# Patient Record
Sex: FEMALE | Race: BLACK OR AFRICAN AMERICAN | NOT HISPANIC OR LATINO | Employment: PART TIME | URBAN - METROPOLITAN AREA
[De-identification: names, ages, dates, MRNs, and addresses within clinical notes are randomized per-mention and may not be internally consistent; named-entity substitution may affect disease eponyms.]

---

## 2020-06-19 ENCOUNTER — OFFICE VISIT - HEALTHEAST (OUTPATIENT)
Dept: FAMILY MEDICINE | Facility: CLINIC | Age: 30
End: 2020-06-19

## 2020-06-19 DIAGNOSIS — Z76.89 ESTABLISHING CARE WITH NEW DOCTOR, ENCOUNTER FOR: ICD-10-CM

## 2020-06-19 DIAGNOSIS — F33.2 SEVERE EPISODE OF RECURRENT MAJOR DEPRESSIVE DISORDER, WITHOUT PSYCHOTIC FEATURES (H): ICD-10-CM

## 2020-06-19 DIAGNOSIS — T74.21XA CONFIRMED VICTIM OF SEXUAL ABUSE IN ADULTHOOD, INITIAL ENCOUNTER: ICD-10-CM

## 2020-06-19 DIAGNOSIS — R45.851 SUICIDAL IDEATION: ICD-10-CM

## 2020-06-19 ASSESSMENT — ANXIETY QUESTIONNAIRES
GAD7 TOTAL SCORE: 18
7. FEELING AFRAID AS IF SOMETHING AWFUL MIGHT HAPPEN: NEARLY EVERY DAY
1. FEELING NERVOUS, ANXIOUS, OR ON EDGE: MORE THAN HALF THE DAYS
6. BECOMING EASILY ANNOYED OR IRRITABLE: NEARLY EVERY DAY
5. BEING SO RESTLESS THAT IT IS HARD TO SIT STILL: SEVERAL DAYS
2. NOT BEING ABLE TO STOP OR CONTROL WORRYING: NEARLY EVERY DAY
4. TROUBLE RELAXING: NEARLY EVERY DAY
3. WORRYING TOO MUCH ABOUT DIFFERENT THINGS: NEARLY EVERY DAY

## 2020-06-19 ASSESSMENT — PATIENT HEALTH QUESTIONNAIRE - PHQ9: SUM OF ALL RESPONSES TO PHQ QUESTIONS 1-9: 20

## 2020-07-01 ENCOUNTER — OFFICE VISIT - HEALTHEAST (OUTPATIENT)
Dept: FAMILY MEDICINE | Facility: CLINIC | Age: 30
End: 2020-07-01

## 2020-07-01 DIAGNOSIS — Z59.71 INSURANCE COVERAGE PROBLEMS: ICD-10-CM

## 2020-07-01 ASSESSMENT — PATIENT HEALTH QUESTIONNAIRE - PHQ9: SUM OF ALL RESPONSES TO PHQ QUESTIONS 1-9: 15

## 2020-07-02 ENCOUNTER — COMMUNICATION - HEALTHEAST (OUTPATIENT)
Dept: NURSING | Facility: CLINIC | Age: 30
End: 2020-07-02

## 2020-07-07 ENCOUNTER — COMMUNICATION - HEALTHEAST (OUTPATIENT)
Dept: NURSING | Facility: CLINIC | Age: 30
End: 2020-07-07

## 2020-07-07 ASSESSMENT — ACTIVITIES OF DAILY LIVING (ADL): DEPENDENT_IADLS:: INDEPENDENT

## 2020-07-08 ENCOUNTER — COMMUNICATION - HEALTHEAST (OUTPATIENT)
Dept: CARE COORDINATION | Facility: CLINIC | Age: 30
End: 2020-07-08

## 2020-07-14 ENCOUNTER — COMMUNICATION - HEALTHEAST (OUTPATIENT)
Dept: CARE COORDINATION | Facility: CLINIC | Age: 30
End: 2020-07-14

## 2020-07-15 ENCOUNTER — AMBULATORY - HEALTHEAST (OUTPATIENT)
Dept: BEHAVIORAL HEALTH | Facility: CLINIC | Age: 30
End: 2020-07-15

## 2020-07-22 ENCOUNTER — COMMUNICATION - HEALTHEAST (OUTPATIENT)
Dept: CARE COORDINATION | Facility: CLINIC | Age: 30
End: 2020-07-22

## 2020-07-28 ENCOUNTER — COMMUNICATION - HEALTHEAST (OUTPATIENT)
Dept: CARE COORDINATION | Facility: CLINIC | Age: 30
End: 2020-07-28

## 2020-07-28 ENCOUNTER — COMMUNICATION - HEALTHEAST (OUTPATIENT)
Dept: NURSING | Facility: CLINIC | Age: 30
End: 2020-07-28

## 2020-08-04 ENCOUNTER — COMMUNICATION - HEALTHEAST (OUTPATIENT)
Dept: CARE COORDINATION | Facility: CLINIC | Age: 30
End: 2020-08-04

## 2020-08-25 ENCOUNTER — COMMUNICATION - HEALTHEAST (OUTPATIENT)
Dept: CARE COORDINATION | Facility: CLINIC | Age: 30
End: 2020-08-25

## 2020-09-14 ENCOUNTER — COMMUNICATION - HEALTHEAST (OUTPATIENT)
Dept: CARE COORDINATION | Facility: CLINIC | Age: 30
End: 2020-09-14

## 2020-09-15 ENCOUNTER — COMMUNICATION - HEALTHEAST (OUTPATIENT)
Dept: CARE COORDINATION | Facility: CLINIC | Age: 30
End: 2020-09-15

## 2020-09-16 ENCOUNTER — COMMUNICATION - HEALTHEAST (OUTPATIENT)
Dept: NURSING | Facility: CLINIC | Age: 30
End: 2020-09-16

## 2020-09-18 ENCOUNTER — COMMUNICATION - HEALTHEAST (OUTPATIENT)
Dept: CARE COORDINATION | Facility: CLINIC | Age: 30
End: 2020-09-18

## 2020-09-24 ENCOUNTER — COMMUNICATION - HEALTHEAST (OUTPATIENT)
Dept: CARE COORDINATION | Facility: CLINIC | Age: 30
End: 2020-09-24

## 2020-10-16 ENCOUNTER — COMMUNICATION - HEALTHEAST (OUTPATIENT)
Dept: CARE COORDINATION | Facility: CLINIC | Age: 30
End: 2020-10-16

## 2020-10-21 ENCOUNTER — COMMUNICATION - HEALTHEAST (OUTPATIENT)
Dept: NURSING | Facility: CLINIC | Age: 30
End: 2020-10-21

## 2020-10-23 ENCOUNTER — COMMUNICATION - HEALTHEAST (OUTPATIENT)
Dept: CARE COORDINATION | Facility: CLINIC | Age: 30
End: 2020-10-23

## 2020-10-27 ENCOUNTER — COMMUNICATION - HEALTHEAST (OUTPATIENT)
Dept: CARE COORDINATION | Facility: CLINIC | Age: 30
End: 2020-10-27

## 2020-11-16 ENCOUNTER — COMMUNICATION - HEALTHEAST (OUTPATIENT)
Dept: NURSING | Facility: CLINIC | Age: 30
End: 2020-11-16

## 2020-11-17 ENCOUNTER — COMMUNICATION - HEALTHEAST (OUTPATIENT)
Dept: CARE COORDINATION | Facility: CLINIC | Age: 30
End: 2020-11-17

## 2020-11-18 ENCOUNTER — COMMUNICATION - HEALTHEAST (OUTPATIENT)
Dept: CARE COORDINATION | Facility: CLINIC | Age: 30
End: 2020-11-18

## 2020-12-03 ENCOUNTER — COMMUNICATION - HEALTHEAST (OUTPATIENT)
Dept: CARE COORDINATION | Facility: CLINIC | Age: 30
End: 2020-12-03

## 2020-12-16 ENCOUNTER — COMMUNICATION - HEALTHEAST (OUTPATIENT)
Dept: CARE COORDINATION | Facility: CLINIC | Age: 30
End: 2020-12-16

## 2020-12-17 ENCOUNTER — COMMUNICATION - HEALTHEAST (OUTPATIENT)
Dept: NURSING | Facility: CLINIC | Age: 30
End: 2020-12-17

## 2021-01-04 ENCOUNTER — COMMUNICATION - HEALTHEAST (OUTPATIENT)
Dept: CARE COORDINATION | Facility: CLINIC | Age: 31
End: 2021-01-04

## 2021-01-04 ENCOUNTER — COMMUNICATION - HEALTHEAST (OUTPATIENT)
Dept: NURSING | Facility: CLINIC | Age: 31
End: 2021-01-04

## 2021-01-12 ENCOUNTER — COMMUNICATION - HEALTHEAST (OUTPATIENT)
Dept: CARE COORDINATION | Facility: CLINIC | Age: 31
End: 2021-01-12

## 2021-01-15 ENCOUNTER — COMMUNICATION - HEALTHEAST (OUTPATIENT)
Dept: CARE COORDINATION | Facility: CLINIC | Age: 31
End: 2021-01-15

## 2021-01-19 ENCOUNTER — COMMUNICATION - HEALTHEAST (OUTPATIENT)
Dept: NURSING | Facility: CLINIC | Age: 31
End: 2021-01-19

## 2021-04-23 ENCOUNTER — OFFICE VISIT - HEALTHEAST (OUTPATIENT)
Dept: FAMILY MEDICINE | Facility: CLINIC | Age: 31
End: 2021-04-23

## 2021-04-23 DIAGNOSIS — Z20.822 EXPOSURE TO COVID-19 VIRUS: ICD-10-CM

## 2021-04-24 ENCOUNTER — COMMUNICATION - HEALTHEAST (OUTPATIENT)
Dept: FAMILY MEDICINE | Facility: CLINIC | Age: 31
End: 2021-04-24

## 2021-05-03 ENCOUNTER — OFFICE VISIT - HEALTHEAST (OUTPATIENT)
Dept: FAMILY MEDICINE | Facility: CLINIC | Age: 31
End: 2021-05-03

## 2021-05-03 DIAGNOSIS — F51.01 PRIMARY INSOMNIA: ICD-10-CM

## 2021-05-03 DIAGNOSIS — F33.2 SEVERE EPISODE OF RECURRENT MAJOR DEPRESSIVE DISORDER, WITHOUT PSYCHOTIC FEATURES (H): ICD-10-CM

## 2021-05-03 RX ORDER — TRAZODONE HYDROCHLORIDE 50 MG/1
50 TABLET, FILM COATED ORAL AT BEDTIME
Qty: 30 TABLET | Refills: 0 | Status: SHIPPED | OUTPATIENT
Start: 2021-05-03

## 2021-05-03 RX ORDER — FLUOXETINE 10 MG/1
10 CAPSULE ORAL DAILY
Qty: 90 CAPSULE | Refills: 3 | Status: SHIPPED | OUTPATIENT
Start: 2021-05-03

## 2021-05-03 ASSESSMENT — PATIENT HEALTH QUESTIONNAIRE - PHQ9: SUM OF ALL RESPONSES TO PHQ QUESTIONS 1-9: 10

## 2021-05-03 ASSESSMENT — ANXIETY QUESTIONNAIRES
GAD7 TOTAL SCORE: 6
6. BECOMING EASILY ANNOYED OR IRRITABLE: NEARLY EVERY DAY
3. WORRYING TOO MUCH ABOUT DIFFERENT THINGS: NOT AT ALL
5. BEING SO RESTLESS THAT IT IS HARD TO SIT STILL: NOT AT ALL
4. TROUBLE RELAXING: NEARLY EVERY DAY
2. NOT BEING ABLE TO STOP OR CONTROL WORRYING: NOT AT ALL
1. FEELING NERVOUS, ANXIOUS, OR ON EDGE: NOT AT ALL
7. FEELING AFRAID AS IF SOMETHING AWFUL MIGHT HAPPEN: NOT AT ALL

## 2021-05-27 ASSESSMENT — PATIENT HEALTH QUESTIONNAIRE - PHQ9
SUM OF ALL RESPONSES TO PHQ QUESTIONS 1-9: 15
SUM OF ALL RESPONSES TO PHQ QUESTIONS 1-9: 20
SUM OF ALL RESPONSES TO PHQ QUESTIONS 1-9: 10

## 2021-05-28 ASSESSMENT — ANXIETY QUESTIONNAIRES
GAD7 TOTAL SCORE: 6
GAD7 TOTAL SCORE: 18

## 2021-06-09 NOTE — PROGRESS NOTES
Programs Applying For: Health Insurance/SNAP/CASH  County: [place]   Case #:  [facility] Worker:   Raj #:   PMI #:   Date Applied:     Outreach:   7/8/20: Outreach attempted x 1. Left message on voicemail with call back information and requested return call.  Plan: FRW will call again within one week.     Health Insurance Information:       Referral:      [facility] Benefits:  Is patient requesting help applying for SNAP/CASH? YES     Screening Questions:  1. Have you recently applied for any county benefits? If so, what and when? NO  2. How many people in your household?  1  3. Do you buy/eat food together? NO  4. What is the monthly gross income for the household (wages, social security, workers comp, and pension)?  Pts income $29/week     Insurance:  Is patient requesting help applying for insurance? YES     Screening Questions:   1. Have you recently applied for insurance or do for a renewal? If so, when?  NO  2. How many people in your household?  1  3. Do you file taxes, who do you claim? 1  4. What is the monthly gross income for the household (wages, social security, workers comp, and pension)?   Pts income $29/week

## 2021-06-09 NOTE — PROGRESS NOTES
Programs Applying For: Health Insurance/SNAP/CASH  County: [place]   Case #:  [facility] Worker:   Raj #:   PMI #:   Date Applied:     Outreach:   7/14/20: FRW called patient on referral and made appointment for 7/22 @ 10:00.  7/8/20: Outreach attempted x 1. Left message on voicemail with call back information and requested return call.  Plan: FRW will call again within one week.     Health Insurance Information:       Referral:      [facility] Benefits:  Is patient requesting help applying for SNAP/CASH? YES     Screening Questions:  1. Have you recently applied for any county benefits? If so, what and when? NO  2. How many people in your household?  1  3. Do you buy/eat food together? NO  4. What is the monthly gross income for the household (wages, social security, workers comp, and pension)?  Pts income $29/week     Insurance:  Is patient requesting help applying for insurance? YES     Screening Questions:   1. Have you recently applied for insurance or do for a renewal? If so, when?  NO  2. How many people in your household?  1  3. Do you file taxes, who do you claim? 1  4. What is the monthly gross income for the household (wages, social security, workers comp, and pension)?   Pts income $29/week
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
## 2021-06-09 NOTE — PROGRESS NOTES
Pt did not answer phone for 11am appt scheduled for today; left VM inviting her to reschedule if interested.  Gene Davis, JUSTIN, [facility]

## 2021-06-09 NOTE — PROGRESS NOTES
Programs Applying For: Health Insurance/SNAP/CASH  County: [place]   Case #:  [facility] Worker:   Raj #:   PMI #:   Date Applied:     Outreach:   7/24/20: FRW called patient at scheduled time. FRW left VM and will make another call next week.   7/22/20:  FRW called patient to complete MNSURE application online. MNsure system was down. FRW will call patient back on Friday at 10. Rebel, [id number]!  7/14/20: FRW called patient on referral and made appointment for 7/22 @ 10:00.  7/8/20: Outreach attempted x 1. Left message on voicemail with call back information and requested return call.  Plan: FRW will call again within one week.     Health Insurance Information:       Referral:      [facility] Benefits:  Is patient requesting help applying for SNAP/CASH? YES     Screening Questions:  1. Have you recently applied for any county benefits? If so, what and when? NO  2. How many people in your household?  1  3. Do you buy/eat food together? NO  4. What is the monthly gross income for the household (wages, social security, workers comp, and pension)?  Pts income $29/week     Insurance:  Is patient requesting help applying for insurance? YES     Screening Questions:   1. Have you recently applied for insurance or do for a renewal? If so, when?  NO  2. How many people in your household?  1  3. Do you file taxes, who do you claim? 1  4. What is the monthly gross income for the household (wages, social security, workers comp, and pension)?   Pts income $29/week

## 2021-06-09 NOTE — PROGRESS NOTES
Clinic Care Coordination Contact  Community Health Worker Initial Outreach    CHW Initial Information Gathering:  Referral Source: PCP  Current living arrangement:: Other(Currently staying with a friend)  Type of residence:: Homeless  Community Resources: None  Supplies used at home:: None  Informal Support system:: Friends  No PCP office visit in Past Year: No       Patient accepts CC: Yes, scheduled with Debbie on 7/7/2020 @ 2pm by phone.       Notes  Homeless- Staying with a friend  Was working and then got laid off due to COVID  She just got her approval letter for Unemployment for $49 a week  Will benefit to connect with FRW for Insurance, Cash/SNAP  Unable to afford medication

## 2021-06-09 NOTE — PROGRESS NOTES
"Maude Sanchez is a 29 y.o. female who is being evaluated via a billable telephone visit.      The patient has been notified of following:     \"This telephone visit will be conducted via a call between you and your physician/provider. We have found that certain health care needs can be provided without the need for a physical exam.  This service lets us provide the care you need with a short phone conversation.  If a prescription is necessary we can send it directly to your pharmacy.  If lab work is needed we can place an order for that and you can then stop by our lab to have the test done at a later time.    Telephone visits are billed at different rates depending on your insurance coverage. During this emergency period, for some insurers they may be billed the same as an in-person visit.  Please reach out to your insurance provider with any questions.    If during the course of the call the physician/provider feels a telephone visit is not appropriate, you will not be charged for this service.\"    Patient has given verbal consent to a Telephone visit? Yes    What phone number would you like to be contacted at? [phone]     Patient would like to receive their AVS by AVS Preference: Shanice.    Additional provider notes: follow up    Patient has her first therapy appointment in 2 weeks.  She has not been able to obtain her medications because she has no insurance.  She is making progress taking care of things in her own life and her PHQ 9 dropped from 20-15.  She feels stable though she does still sometimes have thoughts of hurting herself.    Assessment/Plan:  1. Insurance coverage problems  - Ambulatory referral to Care Management (Primary Care)    2. Depression  -No obvious active plan for suicidality today.  Patient has resources in place if she feels worse.  Patient is not started medication or started therapy.  She does need help obtaining her meds as she has no insurance currently.        Phone call " duration:  3 minutes    Kirsten Dickson MD

## 2021-06-09 NOTE — PROGRESS NOTES
Clinic Care Coordination Contact  [facility]/Voicemail       Clinical Data: Care Coordinator Outreach  Outreach attempted x 1.  Left message on patient's voicemail with call back information and requested return call.  Plan:  Care Coordinator will try to reach patient again in 1-2 business days.

## 2021-06-10 NOTE — PROGRESS NOTES
Programs Applying For: Health Insurance/SNAP/CASH  County: [place]   Case #:  [facility] Worker:   Raj #:   PMI #:   Date Applied:     Outreach:   8/25/20: Outreach attempted x 2. Left message on voicemail indicating last outreach attempt.   Plan: FRW will send an unreachable letter and remove from panel. Patient can be referred back to FRW.     8/4/20: FRW called patient to see if she wanted to schedule a new appointment with FRW. FRW left VM and will move outreach to 1-2 weeks.   7/31/20: FRW called patient at scheduled time. FRW left VM and will make another call next week.   7/28/20: FRW called patient, new appointment set for 7/31 @ 10:00.   7/24/20: FRW called patient at scheduled time. FRW left VM and will make another call next week.   7/22/20:  FRW called patient to complete MNSURE application online. MNsure system was down. FRW will call patient back on Friday at 10. Artemroxana [id number]!  7/14/20: FRW called patient on referral and made appointment for 7/22 @ 10:00.  7/8/20: Outreach attempted x 1. Left message on voicemail with call back information and requested return call.  Plan: FRW will call again within one week.     Health Insurance Information:       Referral:      [facility] Benefits:  Is patient requesting help applying for SNAP/CASH? YES     Screening Questions:  1. Have you recently applied for any county benefits? If so, what and when? NO  2. How many people in your household?  1  3. Do you buy/eat food together? NO  4. What is the monthly gross income for the household (wages, social security, workers comp, and pension)?  Pts income $29/week     Insurance:  Is patient requesting help applying for insurance? YES     Screening Questions:   1. Have you recently applied for insurance or do for a renewal? If so, when?  NO  2. How many people in your household?  1  3. Do you file taxes, who do you claim? 1  4. What is the monthly gross income for the household (wages, social security, workers comp, and  pension)?   Pts income $29/week

## 2021-06-10 NOTE — PROGRESS NOTES
Programs Applying For: Health Insurance/SNAP/CASH  County: [place]   Case #:  [facility] Worker:   Raj #:   PMI #:   Date Applied:     Outreach:   7/31/20: FRW called patient at scheduled time. FRW left VM and will make another call next week.   7/28/20: FRW called patient, new appointment set for 7/31 @ 10:00.   7/24/20: FRW called patient at scheduled time. FRW left VM and will make another call next week.   7/22/20:  FRW called patient to complete MNSURE application online. MNsure system was down. FRW will call patient back on Friday at 10. Rebel, [id number]!  7/14/20: FRW called patient on referral and made appointment for 7/22 @ 10:00.  7/8/20: Outreach attempted x 1. Left message on voicemail with call back information and requested return call.  Plan: FRW will call again within one week.     Health Insurance Information:       Referral:      [facility] Benefits:  Is patient requesting help applying for SNAP/CASH? YES     Screening Questions:  1. Have you recently applied for any county benefits? If so, what and when? NO  2. How many people in your household?  1  3. Do you buy/eat food together? NO  4. What is the monthly gross income for the household (wages, social security, workers comp, and pension)?  Pts income $29/week     Insurance:  Is patient requesting help applying for insurance? YES     Screening Questions:   1. Have you recently applied for insurance or do for a renewal? If so, when?  NO  2. How many people in your household?  1  3. Do you file taxes, who do you claim? 1  4. What is the monthly gross income for the household (wages, social security, workers comp, and pension)?   Pts income $29/week

## 2021-06-10 NOTE — PROGRESS NOTES
Programs Applying For: Health Insurance/SNAP/CASH  County: [place]   Case #:  [facility] Worker:   Raj #:   PMI #:   Date Applied:     Outreach:   8/4/20: FRW called patient to see if she wanted to schedule a new appointment with FRW. FRW left VM and will move outreach to 1-2 weeks.   7/31/20: FRW called patient at scheduled time. FRW left VM and will make another call next week.   7/28/20: FRW called patient, new appointment set for 7/31 @ 10:00.   7/24/20: FRW called patient at scheduled time. FRW left VM and will make another call next week.   7/22/20:  FRW called patient to complete MNSURE application online. MNsure system was down. FRW will call patient back on Friday at 10. Rebel, [id number]!  7/14/20: FRW called patient on referral and made appointment for 7/22 @ 10:00.  7/8/20: Outreach attempted x 1. Left message on voicemail with call back information and requested return call.  Plan: FRW will call again within one week.     Health Insurance Information:       Referral:      [facility] Benefits:  Is patient requesting help applying for SNAP/CASH? YES     Screening Questions:  1. Have you recently applied for any county benefits? If so, what and when? NO  2. How many people in your household?  1  3. Do you buy/eat food together? NO  4. What is the monthly gross income for the household (wages, social security, workers comp, and pension)?  Pts income $29/week     Insurance:  Is patient requesting help applying for insurance? YES     Screening Questions:   1. Have you recently applied for insurance or do for a renewal? If so, when?  NO  2. How many people in your household?  1  3. Do you file taxes, who do you claim? 1  4. What is the monthly gross income for the household (wages, social security, workers comp, and pension)?   Pts income $29/week

## 2021-06-10 NOTE — PROGRESS NOTES
Clinic Care Coordination Contact  Care Team Conversations     KHALIDA ALONSO made two unsuccessful attempts to reach pt today for follow up on goals.  However, FRW was able to reach pt and scheduled visit for 7/31/20.  Pt also scheduled to see PCP on 7/30/20.      KHALIDA ALONSO consulted with FRW.  FRW will update KHALIDA ALONSO after next visit.    Plan:  KHALIDA ALONSO to attempt outreach in 1-2 weeks to encourage pt to reschedule initial therapy appt which she No showed.

## 2021-06-11 NOTE — PROGRESS NOTES
Clinic Care Coordination Contact  Programs Applying For: Health Insurance/SNAP/CASH  County: [place]   Case #:  [facility] Worker:   Raj #:   PMI #:   Date Applied:     Outreach:   9/24/20: FRW called [employer] and spoke with Justine. Justine stated there is an issue on patients account due to her identity and patient needs to call Mnsure to fix. FRW called patient. Patient is going to call Mnsure and call FRW once she can move forward with application.  9/23/20: FRW and patient tried to apply for health insurance through Mns[employer] 4x and website was down. FRW will call patient again on 9/24 to complete application.  9/15/20: FRW called patient on referral. Appointment set for 9/23 to apply for Health Insurance through Mnsure/SNAP/CASH. FRW routed note to CC team for standard or work.   8/25/20: Outreach attempted x 2. Left message on voicemail indicating last outreach attempt.   Plan: FRW will send an unreachable letter and remove from panel. Patient can be referred back to FRW.     8/4/20: FRW called patient to see if she wanted to schedule a new appointment with FRW. FRW left VM and will move outreach to 1-2 weeks.   7/31/20: FRW called patient at scheduled time. FRW left VM and will make another call next week.   7/28/20: FRW called patient, new appointment set for 7/31 @ 10:00.   7/24/20: FRW called patient at scheduled time. FRW left VM and will make another call next week.   7/22/20:  FRW called patient to complete MNSURE application online. [employer] system was down. FRW will call patient back on Friday at 10. Rebel, [id number]!  7/14/20: FRW called patient on referral and made appointment for 7/22 @ 10:00.  7/8/20: Outreach attempted x 1. Left message on voicemail with call back information and requested return call.  Plan: FRW will call again within one week.     Health Insurance Information:       Referral:      [facility] Benefits:  Is patient requesting help applying for SNAP/CASH? YES     Screening Questions:  1.  Have you recently applied for any county benefits? If so, what and when? NO  2. How many people in your household?  1  3. Do you buy/eat food together? NO  4. What is the monthly gross income for the household (wages, social security, workers comp, and pension)?  Pts income $29/week     Insurance:  Is patient requesting help applying for insurance? YES     Screening Questions:   1. Have you recently applied for insurance or do for a renewal? If so, when?  NO  2. How many people in your household?  1  3. Do you file taxes, who do you claim? 1  4. What is the monthly gross income for the household (wages, social security, workers comp, and pension)?   Pts income $29/week  Goals addressed this encounter:   Goals Addressed    None       FRW is waiting for goals to be completed

## 2021-06-11 NOTE — PROGRESS NOTES
Clinic Care Coordination Contact    Situation: Patient chart reviewed by care coordinator.    Background: Patient was unreachable, but did call on 9-15 wanting to resume working with FRW.  [facility] CC had deleted goals on 9-15 per protocol.  Writer reactivated the goal for assistance with applying for county benefits.      Assessment: FRW has scheduled appointment.  No outreach needed at this time.      Plan/Recommendations: Delegating to CHW to contact in less than 30 days to assess goal progression.  [facility] CC available if needs arise. [facility] CC to do oversight in 43 days.     Jaqueline Duncan [facility]  Clinic Care Coordinator  [phone]

## 2021-06-11 NOTE — PROGRESS NOTES
CHW reviewed [facility] chart note from 9/14/2020  CHW delegation, Next CHW outreach due: 1 month 10/14/2020- Patient has no goals at this time- CHW will discuss establishing new goals at next outreach.  FRW is involved last outreach was with Nelly on 9/15/2020

## 2021-06-11 NOTE — PROGRESS NOTES
Clinic Care Coordination Contact  Programs Applying For: Health Insurance/SNAP/CASH  County: [place]   Case #:  [facility] Worker:   Raj #:   PMI #:   Date Applied:     Outreach:   9/23/20: FRW and patient tried to apply for health insurance through Mns[employer] 4x and website was down. FRW will call patient again on 9/24 to complete application.  9/15/20: FRW called patient on referral. Appointment set for 9/23 to apply for Health Insurance through Mnsure/SNAP/CASH. FRW routed note to CC team for standard or work.   8/25/20: Outreach attempted x 2. Left message on voicemail indicating last outreach attempt.   Plan: FRW will send an unreachable letter and remove from panel. Patient can be referred back to FRW.     8/4/20: FRW called patient to see if she wanted to schedule a new appointment with FRW. FRW left VM and will move outreach to 1-2 weeks.   7/31/20: FRW called patient at scheduled time. FRW left VM and will make another call next week.   7/28/20: FRW called patient, new appointment set for 7/31 @ 10:00.   7/24/20: FRW called patient at scheduled time. FRW left VM and will make another call next week.   7/22/20:  FRW called patient to complete MNSURE application online. [employer] system was down. FRW will call patient back on Friday at 10. Rebel, [id number]!  7/14/20: FRW called patient on referral and made appointment for 7/22 @ 10:00.  7/8/20: Outreach attempted x 1. Left message on voicemail with call back information and requested return call.  Plan: FRW will call again within one week.     Health Insurance Information:       Referral:      [place] Benefits:  Is patient requesting help applying for SNAP/CASH? YES     Screening Questions:  1. Have you recently applied for any county benefits? If so, what and when? NO  2. How many people in your household?  1  3. Do you buy/eat food together? NO  4. What is the monthly gross income for the household (wages, social security, workers comp, and pension)?  Pts income  $29/week     Insurance:  Is patient requesting help applying for insurance? YES     Screening Questions:   1. Have you recently applied for insurance or do for a renewal? If so, when?  NO  2. How many people in your household?  1  3. Do you file taxes, who do you claim? 1  4. What is the monthly gross income for the household (wages, social security, workers comp, and pension)?   Pts income $29/week  Goals addressed this encounter:   Goals Addressed    None       FRW is waiting for goals to be completed

## 2021-06-11 NOTE — PROGRESS NOTES
Clinic Care Coordination Contact  [facility]/Voicemail       Clinical Data: Care Coordinator Outreach  Outreach attempted x 3.  Left message on patient's voicemail with call back information and requested return call. Pt has been unreachable by FRW multiple attempts made. Per chart review pt has not rescheduled therapy appt    Plan: Care Coordinator will send unable to contact letter with care coordinator contact information via Simple Ad[employer]. Care Coordinator will do no further outreaches at this time.      ADDENDUM:    KHALIDA ALONSO called and spoke to pt while doing outreach with pts significant other.  Pt states she would like to connect with FRW.  KHALIDA ALONSO to put in new referral for FRW.    CHW to follow up in one month

## 2021-06-12 NOTE — PROGRESS NOTES
Clinic Care Coordination Contact  Programs Applying For: Health Insurance/SNAP/CASH  County: [place]   Case #:  [facility] Worker:   Raj #:   PMI #:   Date Applied:     Outreach:   10/27/20: FRW called pt to see if she spoke with Mnsure. Patient is going to call Mnsure and call FRW back.   10/16/20: FRW called pt to see if she was able to call Mnsure to discuss her account. FRW left VM and will make another outreach in 2 weeks.   9/24/20: FRW called [facility] and spoke with Justine. Justine stated there is an issue on patients account due to her identity and patient needs to call Mnsure to fix. FRW called patient. Patient is going to call Mnsure and call FRW once she can move forward with application.  9/23/20: FRW and patient tried to apply for health insurance through Mnsure 4x and website was down. FRW will call patient again on 9/24 to complete application.  9/15/20: FRW called patient on referral. Appointment set for 9/23 to apply for Health Insurance through Mnsure/SNAP/CASH. FRW routed note to CC team for standard or work.   8/25/20: Outreach attempted x 2. Left message on voicemail indicating last outreach attempt.   Plan: FRW will send an unreachable letter and remove from panel. Patient can be referred back to FRW.     8/4/20: FRW called patient to see if she wanted to schedule a new appointment with FRW. FRW left VM and will move outreach to 1-2 weeks.   7/31/20: FRW called patient at scheduled time. FRW left VM and will make another call next week.   7/28/20: FRW called patient, new appointment set for 7/31 @ 10:00.   7/24/20: FRW called patient at scheduled time. FRW left VM and will make another call next week.   7/22/20:  FRW called patient to complete MNSURE application online. [employer] system was down. FRW will call patient back on Friday at 10. Rebel, [id number]!  7/14/20: FRW called patient on referral and made appointment for 7/22 @ 10:00.  7/8/20: Outreach attempted x 1. Left message on voicemail  with call back information and requested return call.  Plan: FRW will call again within one week.     Health Insurance Information:       Referral:      County Benefits:  Is patient requesting help applying for SNAP/CASH? YES     Screening Questions:  1. Have you recently applied for any county benefits? If so, what and when? NO  2. How many people in your household?  1  3. Do you buy/eat food together? NO  4. What is the monthly gross income for the household (wages, social security, workers comp, and pension)?  Pts income $29/week     Insurance:  Is patient requesting help applying for insurance? YES     Screening Questions:   1. Have you recently applied for insurance or do for a renewal? If so, when?  NO  2. How many people in your household?  1  3. Do you file taxes, who do you claim? 1  4. What is the monthly gross income for the household (wages, social security, workers comp, and pension)?   Pts income $29/week  Goals addressed this encounter:   Goals Addressed    None       FRW is waiting for goals to be completed

## 2021-06-12 NOTE — PROGRESS NOTES
Clinic Care Coordination Contact     Situation: Patient chart reviewed by care coordinator.     Background: Pts initial assessment and enrollment to Care Coordination was 7-8-2020.   Patient centered goals were developed with participation from patient.  CELESTE CC handed patient off to CHW for continued outreach every 30 days.      Assessment: CHW has been in contact with patient monthly though she was unreachable for some time.  Is working with FRW on goal.  Patient has made some progress to goal.       Plan/Recommendations: CHW will involve SW CC as needed or if patient is ready to move to maintenance.  CELESTE CC will continue to monitor progress to goals and CHW outreaches every 6 weeks.     Care Plan updated and mailed to patient: ema Duncan GEORGETTE  Clinic Care Coordinator  [phone]

## 2021-06-12 NOTE — PROGRESS NOTES
Clinic Care Coordination Contact  [facility]/Voicemail       Clinical Data: Care Coordinator Outreach  Outreach attempted x 1.  Left message on patient's voicemail with call back information and requested return call.  Plan: Care Coordinator Follow up with status of goal progression and FRW follow up regarding to insurance and benefits    Care Coordinator will try to reach patient again in 2 weeks.  11/4/2020

## 2021-06-12 NOTE — PROGRESS NOTES
Clinic Care Coordination Contact  Programs Applying For: Health Insurance/SNAP/CASH  County: [place]   Case #:  [facility] Worker:   Raj #:   PMI #:   Date Applied:     Outreach:   10/16/20: FRW called pt to see if she was able to call Mnsure to discuss her account. FRW left VM and will make another outreach in 2 weeks.   9/24/20: FRW called [facility] and spoke with Justine. Justine stated there is an issue on patients account due to her identity and patient needs to call Mnsure to fix. FRW called patient. Patient is going to call Mnsure and call FRW once she can move forward with application.  9/23/20: FRW and patient tried to apply for health insurance through [employer] 4x and website was down. FRW will call patient again on 9/24 to complete application.  9/15/20: FRW called patient on referral. Appointment set for 9/23 to apply for Health Insurance through Mnsure/SNAP/CASH. FRW routed note to CC team for standard or work.   8/25/20: Outreach attempted x 2. Left message on voicemail indicating last outreach attempt.   Plan: FRW will send an unreachable letter and remove from panel. Patient can be referred back to FRW.     8/4/20: FRW called patient to see if she wanted to schedule a new appointment with FRW. FRW left VM and will move outreach to 1-2 weeks.   7/31/20: FRW called patient at scheduled time. FRW left VM and will make another call next week.   7/28/20: FRW called patient, new appointment set for 7/31 @ 10:00.   7/24/20: FRW called patient at scheduled time. FRW left VM and will make another call next week.   7/22/20:  FRW called patient to complete MNSURE application online. [employer] system was down. FRW will call patient back on Friday at Andrei Luque [id number]!  7/14/20: FRW called patient on referral and made appointment for 7/22 @ 10:00.  7/8/20: Outreach attempted x 1. Left message on voicemail with call back information and requested return call.  Plan: FRW will call again within one week.     Apokalyyis  Insurance Information:       Referral:      County Benefits:  Is patient requesting help applying for SNAP/CASH? YES     Screening Questions:  1. Have you recently applied for any county benefits? If so, what and when? NO  2. How many people in your household?  1  3. Do you buy/eat food together? NO  4. What is the monthly gross income for the household (wages, social security, workers comp, and pension)?  Pts income $29/week     Insurance:  Is patient requesting help applying for insurance? YES     Screening Questions:   1. Have you recently applied for insurance or do for a renewal? If so, when?  NO  2. How many people in your household?  1  3. Do you file taxes, who do you claim? 1  4. What is the monthly gross income for the household (wages, social security, workers comp, and pension)?   Pts income $29/week  Goals addressed this encounter:   Goals Addressed    None       FRW is waiting for goals to be completed

## 2021-06-13 NOTE — PROGRESS NOTES
Clinic Care Coordination Contact  Programs Applying For: Health Insurance/SNAP/CASH  County: [place]   Case #:  [facility] Worker:   Raj #:   PMI #:   Date Applied:     Outreach:  12/16/20: FRW called patient and left VM inquiring if she received paperwork from [facility] on health insurance. FRW will make outreach in 1-2 weeks.   12/02/20: FRW called patient and left VM inquiring if she received paperwork from [facility] on health insurance. FRW will make outreach in 1-2 weeks.   11/19/20: FRW called patient to complete MNsure application/SNAP/CASH. FRW and patient applied for MA and patient appears eligible. Patient and FRW were going to start applying for SNAP/CASH but patient's partner stated partner and son are already getting SNAP/MFIP. Patient told FRW they all eat together but patient doesn't want to put partner on application. FRW let patient know FRW would put partner on application due to training. Patient is going to think about applying for SNAP/CASH and touch base with FRW in 2 weeks. FRW updated action steps in financial wellbeing goals and routed note to CC team for standard of work.     Medical Assistance  hide raz Santoro appears to qualify for Medical Assistance. This is an initial eligibility result based on the information you entered on your application. You will get a health care notice showing your final eligibility results. We may need proof to verify your answers to some of the application questions before your coverage can begin. The notice will tell you if you need to send in proof. .  11/18/20: New appointment to apply for SNAP/CASH and figure out Mnsure scheduled 11/19.  11/17/20: FRW called pt to discuss insurance and make an appointment to apply for SNAP/CASH. Patient answered but asked FRW to call back tomorrow 11/18. FRW routing note to CC team.  10/27/20: FRW called pt to see if she spoke with Mnsnorbert. Patient is going to call Mnsure and call FRW back.   10/16/20: FRW called pt to see  if she was able to call [email or web address] to discuss her account. FRW left VM and will make another outreach in 2 weeks.   9/24/20: FRW called [facility] and spoke with Justine. Justine stated there is an issue on patients account due to her identity and patient needs to call Mnsure to fix. FRW called patient. Patient is going to call Mnsure and call FRW once she can move forward with application.  9/23/20: FRW and patient tried to apply for health insurance through [employer] 4x and website was down. FRW will call patient again on 9/24 to complete application.  9/15/20: FRW called patient on referral. Appointment set for 9/23 to apply for Health Insurance through Mnsure/SNAP/CASH. FRW routed note to CC team for standard or work.   8/25/20: Outreach attempted x 2. Left message on voicemail indicating last outreach attempt.   Plan: FRW will send an unreachable letter and remove from panel. Patient can be referred back to FRW.     8/4/20: FRW called patient to see if she wanted to schedule a new appointment with FRW. FRW left VM and will move outreach to 1-2 weeks.   7/31/20: FRW called patient at scheduled time. FRW left VM and will make another call next week.   7/28/20: FRW called patient, new appointment set for 7/31 @ 10:00.   7/24/20: FRW called patient at scheduled time. FRW left VM and will make another call next week.   7/22/20:  FRW called patient to complete MNSURE application online. [employer] system was down. FRW will call patient back on Friday at 10. Sharlenealberto, [id number]!  7/14/20: FRW called patient on referral and made appointment for 7/22 @ 10:00.  7/8/20: Outreach attempted x 1. Left message on voicemail with call back information and requested return call.  Plan: FRW will call again within one week.     Health Insurance Information:       Referral:      County Benefits:  Is patient requesting help applying for SNAP/CASH? YES     Screening Questions:  1. Have you recently applied for any county benefits? If so, what and  when? NO  2. How many people in your household?  1  3. Do you buy/eat food together? NO  4. What is the monthly gross income for the household (wages, social security, workers comp, and pension)?  Pts income $29/week     Insurance:  Is patient requesting help applying for insurance? YES     Screening Questions:   1. Have you recently applied for insurance or do for a renewal? If so, when?  NO  2. How many people in your household?  1  3. Do you file taxes, who do you claim? 1  4. What is the monthly gross income for the household (wages, social security, workers comp, and pension)?   Pts income $29/week  Goals addressed this encounter:   Goals Addressed    None       FRW is waiting for goals to be completed

## 2021-06-13 NOTE — PROGRESS NOTES
Clinic Care Coordination Contact  Programs Applying For: Health Insurance/SNAP/CASH  County: [place]   Case #:  [facility] Worker:   Raj #:   PMI #:   Date Applied:     Outreach:  12/20/20: FRW called patient and left VM inquiring if she received paperwork from [facility] on health insurance. FRW will make outreach in 1-2 weeks.   11/19/20: FRW called patient to complete MNsure application/SNAP/CASH. FRW and patient applied for MA and patient appears eligible. Patient and FRW were going to start applying for SNAP/CASH but patient's partner stated partner and son are already getting SNAP/MFIP. Patient told FRW they all eat together but patient doesn't want to put partner on application. FRW let patient know FRW would put partner on application due to training. Patient is going to think about applying for SNAP/CASH and touch base with FRW in 2 weeks. FRW updated action steps in financial wellbeing goals and routed note to CC team for standard of work.     Medical Assistance  hide details  Maude appears to qualify for Medical Assistance. This is an initial eligibility result based on the information you entered on your application. You will get a health care notice showing your final eligibility results. We may need proof to verify your answers to some of the application questions before your coverage can begin. The notice will tell you if you need to send in proof. .  11/18/20: New appointment to apply for SNAP/CASH and figure out Mnsure scheduled 11/19.  11/17/20: FRW called pt to discuss insurance and make an appointment to apply for SNAP/CASH. Patient answered but asked FRW to call back tomorrow 11/18. FRW routing note to CC team.  10/27/20: FRW called pt to see if she spoke with Raj. Patient is going to call Mnsure and call FRW back.   10/16/20: FRW called pt to see if she was able to call Mnsure to discuss her account. FRW left VM and will make another outreach in 2 weeks.   9/24/20: FRW called [facility] and spoke  with Justine. Justine stated there is an issue on patients account due to her identity and patient needs to call Mnsure to fix. FRW called patient. Patient is going to call Mnsure and call FRW once she can move forward with application.  9/23/20: FRW and patient tried to apply for health insurance through Mnsure 4x and website was down. FRW will call patient again on 9/24 to complete application.  9/15/20: FRW called patient on referral. Appointment set for 9/23 to apply for Health Insurance through Mnsure/SNAP/CASH. FRW routed note to CC team for standard or work.   8/25/20: Outreach attempted x 2. Left message on voicemail indicating last outreach attempt.   Plan: FRW will send an unreachable letter and remove from panel. Patient can be referred back to FRW.     8/4/20: FRW called patient to see if she wanted to schedule a new appointment with FRW. FRW left VM and will move outreach to 1-2 weeks.   7/31/20: FRW called patient at scheduled time. FRW left VM and will make another call next week.   7/28/20: FRW called patient, new appointment set for 7/31 @ 10:00.   7/24/20: FRW called patient at scheduled time. FRW left VM and will make another call next week.   7/22/20:  FRW called patient to complete MNSURE application online. [employer] system was down. FRW will call patient back on Friday at 10. Rebel, [id number]!  7/14/20: FRW called patient on referral and made appointment for 7/22 @ 10:00.  7/8/20: Outreach attempted x 1. Left message on voicemail with call back information and requested return call.  Plan: FRW will call again within one week.     Health Insurance Information:       Referral:      County Benefits:  Is patient requesting help applying for SNAP/CASH? YES     Screening Questions:  1. Have you recently applied for any county benefits? If so, what and when? NO  2. How many people in your household?  1  3. Do you buy/eat food together? NO  4. What is the monthly gross income for the household  (wages, social security, workers comp, and pension)?  Pts income $29/week     Insurance:  Is patient requesting help applying for insurance? YES     Screening Questions:   1. Have you recently applied for insurance or do for a renewal? If so, when?  NO  2. How many people in your household?  1  3. Do you file taxes, who do you claim? 1  4. What is the monthly gross income for the household (wages, social security, workers comp, and pension)?   Pts income $29/week  Goals addressed this encounter:   Goals Addressed    None       FRW is waiting for goals to be completed

## 2021-06-13 NOTE — PROGRESS NOTES
Clinic Care Coordination Contact  [facility]/Voicemail       Clinical Data: Care Coordinator Outreach  Outreach attempted x 1.  Left message on patient's voicemail with call back information and requested return call.  Plan: Care Coordinator discuss goal progression and update FRW status   Care Coordinator will try to reach patient again in 2 weeks.  12/29/2020

## 2021-06-13 NOTE — PROGRESS NOTES
Clinic Care Coordination Contact  Programs Applying For: Health Insurance/SNAP/CASH  County: [place]   Case #:  [facility] Worker:   Raj #:   PMI #:   Date Applied:     Outreach:   11/17/20: FRW called pt to discuss insurance and make an appointment to apply for SNAP/CASH. Patient answered but asked FRW to call back tomorrow 11/18. FRW routing note to CC team.  10/27/20: FRW called pt to see if she spoke with Mnsure. Patient is going to call Mnsure and call FRW back.   10/16/20: FRW called pt to see if she was able to call Mnsure to discuss her account. FRW left VM and will make another outreach in 2 weeks.   9/24/20: FRW called [facility] and spoke with Justine. Justine stated there is an issue on patients account due to her identity and patient needs to call Mnsure to fix. FRW called patient. Patient is going to call Mnsure and call FRW once she can move forward with application.  9/23/20: FRW and patient tried to apply for health insurance through Mnsure 4x and website was down. FRW will call patient again on 9/24 to complete application.  9/15/20: FRW called patient on referral. Appointment set for 9/23 to apply for Health Insurance through Mnsure/SNAP/CASH. FRW routed note to CC team for standard or work.   8/25/20: Outreach attempted x 2. Left message on voicemail indicating last outreach attempt.   Plan: FRW will send an unreachable letter and remove from panel. Patient can be referred back to FRW.     8/4/20: FRW called patient to see if she wanted to schedule a new appointment with FRW. FRW left VM and will move outreach to 1-2 weeks.   7/31/20: FRW called patient at scheduled time. FRW left VM and will make another call next week.   7/28/20: FRW called patient, new appointment set for 7/31 @ 10:00.   7/24/20: FRW called patient at scheduled time. FRW left VM and will make another call next week.   7/22/20:  FRW called patient to complete MNSURE application online. [employer] system was down. FRW will call patient  back on Friday at Andrei Luque, [id number]!  7/14/20: FRW called patient on referral and made appointment for 7/22 @ 10:00.  7/8/20: Outreach attempted x 1. Left message on voicemail with call back information and requested return call.  Plan: FRW will call again within one week.     Health Insurance Information:       Referral:      [facility] Benefits:  Is patient requesting help applying for SNAP/CASH? YES     Screening Questions:  1. Have you recently applied for any county benefits? If so, what and when? NO  2. How many people in your household?  1  3. Do you buy/eat food together? NO  4. What is the monthly gross income for the household (wages, social security, workers comp, and pension)?  Pts income $29/week     Insurance:  Is patient requesting help applying for insurance? YES     Screening Questions:   1. Have you recently applied for insurance or do for a renewal? If so, when?  NO  2. How many people in your household?  1  3. Do you file taxes, who do you claim? 1  4. What is the monthly gross income for the household (wages, social security, workers comp, and pension)?   Pts income $29/week  Goals addressed this encounter:   Goals Addressed    None       FRW is waiting for goals to be completed

## 2021-06-13 NOTE — PROGRESS NOTES
Clinic Care Coordination Contact     Situation: Patient chart reviewed by care coordinator.     Background: Pts initial assessment and enrollment to Care Coordination was 7-8-2020.   Patient centered goals were developed with participation from patient.  [facility]LE[facility] CC handed patient off to CHW for continued outreach every 30 days, last contact on 11-16.      Assessment: CHW has been in contact with patient monthly. Patient has made progress to goal and is working with FRW on health insurance.       Plan/Recommendations: CHW will involve [facility] CC as needed or if patient is ready to move to maintenance.  CELESTE CC will continue to monitor progress to goals and CHW outreaches every 6 weeks.

## 2021-06-14 NOTE — PROGRESS NOTES
Clinic Care Coordination Contact  [facility]/Voicemail       Clinical Data: Care Coordinator Outreach  Outreach attempted x 2.  Left message on patient's voicemail with call back information and requested return call.  Plan: Care Coordinator Will make final attempt to reach patient and to check on status with FRW. Care Coordinator will try to reach patient again in 10 business days.  1/12/2021

## 2021-06-14 NOTE — PROGRESS NOTES
Clinic Care Coordination Contact  Programs Applying For: Health Insurance/SNAP/CASH  County: [place]   Case #:  [facility] Worker:   Raj #:   PMI #:   Date Applied:     Outreach:  1/15/21: FRW called patient and left VM. FRW removing patient from panel and routing note to CC team for standard of work.  1/2/21: FRW called patient to discuss paperwork. FRW left VM and will make last attempt in 1-2 weeks.   12/16/20: FRW called patient and left VM inquiring if she received paperwork from [facility]placed by [facility] on health insurance. FRW will make outreach in 1-2 weeks.   12/02/20: FRW called patient and left VM inquiring if she received paperwork from Replaced by [facility] on health insurance. FRW will make outreach in 1-2 weeks.   11/19/20: FRW called patient to complete [place] application/SNAP/CASH. FRW and patient applied for MA and patient appears eligible. Patient and FRW were going to start applying for SNAP/CASH but patient's partner stated partner and son are already getting SNAP/MFIP. Patient told FRW they all eat together but patient doesn't want to put partner on application. FRW let patient know FRW would put partner on application due to training. Patient is going to think about applying for SNAP/CASH and touch base with FRW in 2 weeks. FRW updated action steps in financial wellbeing goals and routed note to CC team for standard of work.     Medical Assistance  hide details  Maude appears to qualify for Medical Assistance. This is an initial eligibility result based on the information you entered on your application. You will get a health care notice showing your final eligibility results. We may need proof to verify your answers to some of the application questions before your coverage can begin. The notice will tell you if you need to send in proof. .  11/18/20: New appointment to apply for SNAP/CASH and figure out Mnsure scheduled 11/19.  11/17/20: FRW called pt to discuss insurance and make an appointment to apply for SNAP/CASH.  Patient answered but asked FRW to call back tomorrow 11/18. FRW routing note to CC team.  10/27/20: FRW called pt to see if she spoke with [employer]. Patient is going to call Mnsure and call FRW back.   10/16/20: FRW called pt to see if she was able to call Mnsure to discuss her account. FRW left VM and will make another outreach in 2 weeks.   9/24/20: FRW called [facility] and spoke with Justine. Justine stated there is an issue on patients account due to her identity and patient needs to call Mnsure to fix. FRW called patient. Patient is going to call Mnsure and call FRW once she can move forward with application.  9/23/20: FRW and patient tried to apply for health insurance through [employer] and website was down. FRW will call patient again on 9/24 to complete application.  9/15/20: FRW called patient on referral. Appointment set for 9/23 to apply for Health Insurance through [employer]ns[employer]/SNAP/CASH. FRW routed note to CC team for standard or work.   8/25/20: Outreach attempted x 2. Left message on voicemail indicating last outreach attempt.   Plan: FRW will send an unreachable letter and remove from panel. Patient can be referred back to FRW.     8/4/20: FRW called patient to see if she wanted to schedule a new appointment with FRW. FRW left VM and will move outreach to 1-2 weeks.   7/31/20: FRW called patient at scheduled time. FRW left VM and will make another call next week.   7/28/20: FRW called patient, new appointment set for 7/31 @ 10:00.   7/24/20: FRW called patient at scheduled time. FRW left VM and will make another call next week.   7/22/20:  FRW called patient to complete MNSURE application online. [employer] system was down. FRW will call patient back on Friday at 10. Rebel, [id number]!  7/14/20: FRW called patient on referral and made appointment for 7/22 @ 10:00.  7/8/20: Outreach attempted x 1. Left message on voicemail with call back information and requested return call.  Plan: FRW will call again within one  week.     Health Insurance Information:       Referral:      [facility] Benefits:  Is patient requesting help applying for SNAP/CASH? YES     Screening Questions:  1. Have you recently applied for any county benefits? If so, what and when? NO  2. How many people in your household?  1  3. Do you buy/eat food together? NO  4. What is the monthly gross income for the household (wages, social security, workers comp, and pension)?  Pts income $29/week     Insurance:  Is patient requesting help applying for insurance? YES     Screening Questions:   1. Have you recently applied for insurance or do for a renewal? If so, when?  NO  2. How many people in your household?  1  3. Do you file taxes, who do you claim? 1  4. What is the monthly gross income for the household (wages, social security, workers comp, and pension)?   Pts income $29/week  Goals addressed this encounter:   Goals Addressed    None       FRW is waiting for goals to be completed

## 2021-06-14 NOTE — PROGRESS NOTES
Clinic Care Coordination Contact  Programs Applying For: Health Insurance/SNAP/CASH  County: [place]   Case #:  [facility] Worker:   Raj #:   PMI #:   Date Applied:     Outreach:  1/2/21: FRW called patient to discuss paperwork. FRW left VM and will make last attempt in 1-2 weeks.   12/16/20: FRW called patient and left VM inquiring if she received paperwork from [facility] on health insurance. FRW will make outreach in 1-2 weeks.   12/02/20: FRW called patient and left VM inquiring if she received paperwork from [facility] on health insurance. FRW will make outreach in 1-2 weeks.   11/19/20: FRW called patient to complete MNsure application/SNAP/CASH. FRW and patient applied for MA and patient appears eligible. Patient and FRW were going to start applying for SNAP/CASH but patient's partner stated partner and son are already getting SNAP/MFIP. Patient told FRW they all eat together but patient doesn't want to put partner on application. FRW let patient know FRW would put partner on application due to training. Patient is going to think about applying for SNAP/CASH and touch base with FRW in 2 weeks. FRW updated action steps in financial wellbeing goals and routed note to CC team for standard of work.     Medical Assistance  blaine Santoro appears to qualify for Medical Assistance. This is an initial eligibility result based on the information you entered on your application. You will get a health care notice showing your final eligibility results. We may need proof to verify your answers to some of the application questions before your coverage can begin. The notice will tell you if you need to send in proof. .  11/18/20: New appointment to apply for SNAP/CASH and figure out Mnsure scheduled 11/19.  11/17/20: FRW called pt to discuss insurance and make an appointment to apply for SNAP/CASH. Patient answered but asked FRW to call back tomorrow 11/18. FRW routing note to CC team.  10/27/20: FRW called pt to see if she  spoke with [employer]. Patient is going to call Mnsure and call FRW back.   10/16/20: FRW called pt to see if she was able to call Mnsure to discuss her account. FRW left VM and will make another outreach in 2 weeks.   9/24/20: FRW called [facility] and spoke with Justine. Justine stated there is an issue on patients account due to her identity and patient needs to call Mnsure to fix. FRW called patient. Patient is going to call Mnsure and call FRW once she can move forward with application.  9/23/20: FRW and patient tried to apply for health insurance through [employer] and website was down. FRW will call patient again on 9/24 to complete application.  9/15/20: FRW called patient on referral. Appointment set for 9/23 to apply for Health Insurance through [employer]ns[employer]/SNAP/CASH. FRW routed note to CC team for standard or work.   8/25/20: Outreach attempted x 2. Left message on voicemail indicating last outreach attempt.   Plan: FRW will send an unreachable letter and remove from panel. Patient can be referred back to FRW.     8/4/20: FRW called patient to see if she wanted to schedule a new appointment with FRW. FRW left VM and will move outreach to 1-2 weeks.   7/31/20: FRW called patient at scheduled time. FRW left VM and will make another call next week.   7/28/20: FRW called patient, new appointment set for 7/31 @ 10:00.   7/24/20: FRW called patient at scheduled time. FRW left VM and will make another call next week.   7/22/20:  FRW called patient to complete MNSURE application online. [employer] system was down. FRW will call patient back on Friday at 10. Rebel, [id number]!  7/14/20: FRW called patient on referral and made appointment for 7/22 @ 10:00.  7/8/20: Outreach attempted x 1. Left message on voicemail with call back information and requested return call.  Plan: FRW will call again within one week.     Health Insurance Information:       Referral:      [facility] Benefits:  Is patient requesting help applying for  SNAP/CASH? YES     Screening Questions:  1. Have you recently applied for any county benefits? If so, what and when? NO  2. How many people in your household?  1  3. Do you buy/eat food together? NO  4. What is the monthly gross income for the household (wages, social security, workers comp, and pension)?  Pts income $29/week     Insurance:  Is patient requesting help applying for insurance? YES     Screening Questions:   1. Have you recently applied for insurance or do for a renewal? If so, when?  NO  2. How many people in your household?  1  3. Do you file taxes, who do you claim? 1  4. What is the monthly gross income for the household (wages, social security, workers comp, and pension)?   Pts income $29/week  Goals addressed this encounter:   Goals Addressed    None       FRW is waiting for goals to be completed

## 2021-06-14 NOTE — PROGRESS NOTES
Clinic Care Coordination Contact     Situation: Patient chart reviewed by care coordinator.     Background: Pts initial assessment and enrollment to Care Coordination was 7-8-2020.   Patient centered goals were developed with participation from patient.  [facility] CC handed patient off to CHW for continued outreach every 30 days.      Assessment: CHW has been unable to reach patient monthly. Patient has made unknown progress to goals.  FRW also is unable to reach.      Plan/Recommendations: CHW will involve SW CC as needed or if patient is ready to move to maintenance.  Will be sending disenrollment letter at next attempt.   SW CC will continue to monitor progress to goals and CHW outreaches every 6 weeks.     Care Plan updated and mailed to patient: no

## 2021-06-16 PROBLEM — T74.21XA: Status: ACTIVE | Noted: 2020-06-19

## 2021-06-16 PROBLEM — F33.2 SEVERE EPISODE OF RECURRENT MAJOR DEPRESSIVE DISORDER, WITHOUT PSYCHOTIC FEATURES (H): Status: ACTIVE | Noted: 2020-06-19

## 2021-06-16 NOTE — PROGRESS NOTES
Maude Sanchez is a 30 y.o. female who is being evaluated via a billable telephone visit.      What phone number would you like to be contacted at? [phone]  How would you like to obtain your AVS? AVS Preference: Mail a copy.    Assessment & Plan     Exposure to COVID-19 virus  COVID-19 exposure to patient's nephew on Friday, April 16, 2021.  Half pack per day smoking habit.  No other risks identified.  Asymptomatic currently.  COVID-19 virus PCR testing to be completed.  Continue quarantine until results available.  - Asymptomatic COVID-19 Virus (CORONAVIRUS) PCR    Depression with h/o suicidal ideation  History of suicidal ideation reviewed June 2020.  Was prescribed fluoxetine 10 mg daily as well as hydroxyzine available 25 mg 3 times daily as needed for anxiety or insomnia.  Did not take medication for this.  Does agree to follow-up and is scheduled for Monday, April 26, 2021 with Dr. Dickson.  [id number]}     Tobacco Cessation:   reports that she has been smoking cigarettes. She has been smoking about 0.50 packs per day. She has never used smokeless tobacco.  I have counseled the patient for tobacco cessation and the follow up will occur  at the next visit.  Return in about 4 weeks (around 5/21/2021) for Recheck depression.    Farzad Joya MD  [facility]   Maude Sanchez is 30 y.o. and presents today for the following health issues   HPI   30-year-old healthy female calling today with virtual visit completed for Covid exposure Friday, April 16, 2021.  Exposed to a nephew who was diagnosed with Covid 19 infection and is continuing to do well.  Patient remains asymptomatic and is not aware of prior COVID-19 infection.  Denies history of asthma.  Does smoke perhaps between 10 and 12 cigarettes/day.  No cough, fever or shortness of breath described.  Does not work in healthcare.  Is quarantining currently until results available.  Did review history of depression with  suicidal ideation.  Had been seen June 19, 2020.  Never took medication including fluoxetine 10 mg daily or hydroxyzine for anxiety.  Feeling much better however does agree that she should follow-up with Dr. Dickson, PCP.      Review of Systems  As above otherwise all negative.      Objective       Vitals:  No vitals were obtained today due to virtual visit.    Physical Exam  Virtual visit completed.  No respiratory distress.  Pleasant and cooperative and forthcoming.          Phone call duration: 11 minutes

## 2021-06-20 NOTE — LETTER
Letter by Debbie Navarro LICSW at      Author: Debbie Navarro LICSW Service: -- Author Type: --    Filed:  Encounter Date: 7/7/2020 Status: (Other)       CARE COORDINATION  [facility]  [address]  [place] [address]    July 8, 2020    Maude Sanchez  [address]  [address]  [facility] [place] [facility] [address]      Dear Maude,    I am a clinic care coordinator  who works with Kirsten Dickson MD. I wanted to thank you for spending the time to talk with me.  Below is a description of clinic care coordination and how I can further assist you.      The clinic care coordination team is made up of a registered nurse,  and community health worker who understand the health care system. The goal of clinic care coordination is to help you manage your health and improve access to the health care system in the most efficient manner. The team can assist you in meeting your health care goals by providing education, coordinating services, strengthening the communication among your providers and supporting you with any resource needs.    Please feel free to contact the Community Health Worker at [phone] with any questions or concerns. We are focused on providing you with the highest-quality healthcare experience possible and that all starts with you.     Sincerely,     Debbie GOSS  Social Work Care Coordinator    Enclosed: I have enclosed a copy of the Care Plan. This has helpful information and goals that we have talked about. Please keep this in an easy to access place to use as needed.

## 2021-06-20 NOTE — LETTER
Letter by Debbie Navarro LICSW at      Author: Debbie Navarro LICSW Service: -- Author Type: --    Filed:  Encounter Date: 9/14/2020 Status: (Other)       CARE COORDINATION  [facility]  [address]  [place] [address]    September 14, 2020    Maude Sanchez  [address]  [address]  [facility] [address]      Dear Maude,    I am a clinic care coordinator who works with Kirsten Dickson MD. I recently tried to call and was unable to reach you. Below is a description of clinic care coordination and how I can further assist you.      The clinic care coordination team is made up of a registered nurse,  and community health worker who understand the health care system. The goal of clinic care coordination is to help you manage your health and improve access to the health care system in the most efficient manner. The team can assist you in meeting your health care goals by providing education, coordinating services, strengthening the communication among your providers and supporting you with any resource needs.    Please feel free to contact the Community Health Worker at [phone] with any questions or concerns. We are focused on providing you with the highest-quality healthcare experience possible and that all starts with you.     Sincerely,     Debbie GOSS
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
## 2021-06-21 ENCOUNTER — RECORDS - HEALTHEAST (OUTPATIENT)
Dept: ADMINISTRATIVE | Facility: OTHER | Age: 31
End: 2021-06-21

## 2021-06-21 ENCOUNTER — NURSE TRIAGE (OUTPATIENT)
Dept: NURSING | Facility: CLINIC | Age: 31
End: 2021-06-21

## 2021-06-21 ENCOUNTER — HOSPITAL ENCOUNTER (EMERGENCY)
Dept: EMERGENCY MEDICINE | Facility: HOSPITAL | Age: 31
Discharge: LEFT BEFORE DISCHARGE | End: 2021-06-21

## 2021-06-21 NOTE — TELEPHONE ENCOUNTER
Triage Call:  Patient is calling stating about 2 hours into her shift she obtained a laceration to her right lower knee about 2-3 inches. Bruising forming, patient was limping. Throbbing pain 9.5/10. Patient stated she cut it on a metal Railguard. Per protocol guidelines patient was advised to be seen in the ED. Patient stated understanding.    COVID 19 Nurse Triage Plan/Patient Instructions    Please be aware that novel coronavirus (COVID-19) may be circulating in the community. If you develop symptoms such as fever, cough, or SOB or if you have concerns about the presence of another infection including coronavirus (COVID-19), please contact your health care provider or visit [email or web address].     Disposition/Instructions    ED Visit recommended. Follow protocol based instructions.     Bring Your Own Device:  Please also bring your smart device(s) (smart phones, tablets, laptops) and their charging cables for your personal use and to communicate with your care team during your visit.    Thank you for taking steps to prevent the spread of this virus.  o Limit your contact with others.  o Wear a simple mask to cover your cough.  o Wash your hands well and often.    Resources    [facility]: About COVID-19: [email or web address]    CDC: What to Do If You're Sick: www.cdc.gov/coronavirus/2019-ncov/about/steps-when-sick.html    CDC: Ending Home Isolation: www.cdc.gov/coronavirus/2019-ncov/hcp/disposition-in-home-patients.html     CDC: Caring for Someone: www.cdc.gov/coronavirus/2019-ncov/if-you-are-sick/care-for-someone.html     [facility]: Interim Guidance for Hospital Discharge to Home: www.health.[facility].[place]/diseases/coronavirus/hcp/hospdischarge.pdf    [facility] clinical trials (COVID-19 research studies): [email or web address].[email or web address]/[email or web address]-trials     Below are the COVID-19 hotlines at the [place] Department of Health ([facility]). Interpreters are available.   o For health  questions: Call 887-542-4203 or 1-304.485.3202 (7 a.m. to 7 p.m.)  o For questions about schools and childcare: Call 937-872-5401 or 1-304.883.5607 (7 a.m. to 7 p.m.)     Sheila Sanchez RN Nursing Advisor 6/21/2021 6:23 PM     Reason for Disposition    Skin is split open or gaping (or length > 1/2 inch or 12 mm on the skin, 1/4 inch or 6 mm on the face)    Additional Information    Negative: [1] Major bleeding (e.g., actively dripping or spurting) AND [2] can't be stopped    Negative: Amputation    Negative: Shock suspected (e.g., cold/pale/clammy skin, too weak to stand, low BP, rapid pulse)    Negative: [1] Knife wound (or other possibly deep cut) AND [2] to chest, abdomen, back, neck, or head    Negative: [1] Cutter (self-mutilator) AND [2] suicidal or out-of-control    Negative: Sounds like a life-threatening emergency to the triager    Negative: [1] Animal bite AND [2] broken skin    Negative: [1] Human bite AND [2] broken skin    Negative: [1] Bleeding AND [2] won't stop after 10 minutes of direct pressure (using correct technique)    Protocols used: CUTS AND NLENYDINBGY-Y-AD

## 2021-06-21 NOTE — LETTER
Letter by Kirsten Curtis CHW at      Author: Kirsten Curtis CHW Service: -- Author Type: --    Filed:  Encounter Date: 1/19/2021 Status: (Other)       CARE COORDINATION  [facility]  [address] [place] [address]    January 19, 2021    Maude Sanchez  [address]  [address]  [facility] [address]      Dear Maude,  I have been unsuccessful in reaching you since our last contact. At this time the Care Coordination team will make no further attempts to reach you, however this does not change your ability to continue receiving care from your providers at your primary care clinic. If you need additional support from a care coordinator in the future please contact Katelyn  at [phone].    All of us at [facility] are invested in your health and are here to assist you in meeting your goals.     Sincerely,  Katelyn JI  [phone]

## 2021-06-25 NOTE — ED TRIAGE NOTES
Patient with laceration to R shin. Patient states injury occurred approximately 10 hours ago at work. Cut leg on metal. Bleed is controlled.

## 2021-06-26 ENCOUNTER — HEALTH MAINTENANCE LETTER (OUTPATIENT)
Age: 31
End: 2021-06-26

## 2021-06-29 NOTE — PROGRESS NOTES
Progress Notes by Debbie Navarro LICSW at 7/7/2020  2:00 PM     Author: Debbie Navarro LICSW Service: -- Author Type:     Filed: 7/8/2020 12:29 PM Encounter Date: 7/7/2020 Status: Signed    : Debbie Navarro LICSW ()       Clinic Care Coordination Contact    Clinic Care Coordination Contact  OUTREACH- INITIAL ASSESSMENT    Referral Information:  Referral Source: PCP      Chief Complaint   Patient presents with   ? Clinic Care Coordination - Initial        Universal Utilization:      Utilization    Last refreshed: 7/8/2020 11:40 AM:  Hospital Admissions 0           Last refreshed: 7/8/2020 11:40 AM:  ED Visits 0           Last refreshed: 7/8/2020 11:40 AM:  No Show Count (past year) 0              Current as of: 7/8/2020 11:40 AM              Clinical Concerns:  Current Medical Concerns:  none notes  Current Behavioral Concerns: depression   Education Provided to patient: housing resources, FRW referral process,  [employer] $4 list     Medication Management:  Independent.  Pt to contact clinic to have prescription sent to [facility] as one of her medications is on the $4 list    Functional Status:  Dependent ADLs:: Independent  Dependent IADLs:: Independent  Bed or wheelchair confined:: No  Mobility Status: Independent  Fallen 2 or more times in the past year?: No  Any fall with injury in the past year?: No    Living Situation:  Current living arrangement:: Other(Currently staying with a friend)  Type of residence:: Homeless    Lifestyle & Psychosocial Needs:    Pt is currently staying with a friend since February.  Prior to that in homeless shelter.  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  Recently approved for Unemployment but only $29/week.  Pt unable to apply for new jobs as she needs a replacement SS card or birth certificate.  She is working on this.  Pt looking for subsidized housing resources.  FRW referral placed for MA, SNAP and cash.          Social Needs   ?  Financial resource strain: Very hard   ? Food insecurity     Worry: Sometimes true     Inability: Sometimes true   ? Transportation needs     Medical: No     Non-medical: Yes        Transportation means:: Public transportation     Anglican or spiritual beliefs that impact treatment:: No  Mental health DX:: Yes  Mental health DX how managed:: Medication, Outpatient Counseling(Pt needs to fill meds, first therapy appt scheduled)  Mental health management concern (GOAL):: Yes  Informal Support system:: Friends   Socioeconomic History   ? Marital status: Single     Spouse name: Not on file   ? Number of children: Not on file   ? Years of education: Not on file   ? Highest education level: Not on file     Tobacco Use   ? Smoking status: Current Every Day Smoker     Packs/day: 0.50     Types: Cigarettes   ? Smokeless tobacco: Never Used   Substance and Sexual Activity   ? Alcohol use: Yes     Frequency: Monthly or less   ? Drug use: Yes     Types: Marijuana   ? Sexual activity: Yes     Partners: Female     Birth control/protection: None         Resources and Interventions:  Current Resources:      Community Resources: None  Supplies used at home:: None  Equipment Currently Used at Home: none    Advance Care Plan/Directive  Advanced Care Plans/Directives on file:: No  Type Advanced Care Plans/Directives: Other  Advanced Care Plan/Directive Status: Other (comment field)(Not discussed)    Referrals Placed: Prescription Assistance Programs, [facility] Resources([place] net $4 list, [facility] resources, FRW referral)     Goals:   Goals        General    Financial Wellbeing (pt-stated)     Notes - Note created  7/8/2020 11:41 AM by Debbie Navarro [facility]    Patient Stated: I will apply for [facility] Benefits including MA, SNAP and Cash within the next 1-2 weeks if I am eligible.     Action Steps to Achieve this Goal   1. I understand a referral was placed to the Financial Resource Worker, I will receive a call within the next 3  business days.      2. I understand the financial worker will make two attempts to call me. If I still need help with this goal, I will update CC team on outreach calls      Mental Health Management (pt-stated)     Notes - Note edited  7/8/2020 11:40 AM by Debbie Navarro LICSW    Goal Statement: I will improve mental health symptoms in the next 3 months    Date Goal set: 7/8/20  Barriers: no insurance  Strengths: motivated  Date to Achieve By: October 1  Patient expressed understanding of goal: yes    Action steps to achieve this goal:  1. I will fill my prescription at [facility] for $4 until I determine if I qualify for MA    2. I will take medications as prescribed    3. I will attend initial therapy appt on 7/15 with JUSTIN Kam and subsequent therapy appts        Psychosocial (pt-stated)     Notes - Note created  7/8/2020 11:45 AM by Debbie Navarro LICSW    Goal Statement: I will get a replacement birth certificate or Social Security card so I can apply for jobs    Date Goal set: 7/8/20  Barriers: offices not open due to COVID  Strengths: motivated  Date to Achieve By: September 1    Patient expressed understanding of goal: yes    Action steps to achieve this goal:  1. I will follow steps needed to obtain my [place] birth certificate    2. I will check to see if I am able to go in person to local  office as I am unable to apply online due to this is not an option for [place] residents    3. I will update CC team on outreach calls          Programs Interested In:    County Benefits:  Is patient requesting help applying for SNAP/CASH? YES    Screening Questions:  1. Have you recently applied for any county benefits? If so, what and when? NO  2. How many people in your household?  1  3. Do you buy/eat food together? NO  4. What is the monthly gross income for the household (wages, social security, workers comp, and pension)?  Pts income $29/week    Insurance:  Is patient requesting help applying for  insurance? YES    Screening Questions:   1. Have you recently applied for insurance or do for a renewal? If so, when?  NO  2. How many people in your household?  1  3. Do you file taxes, who do you claim? 1  4. What is the monthly gross income for the household (wages, social security, workers comp, and pension)?   Pts income $29/week        Patient/Caregiver understanding:     Pt agreeable to enrollment in CC and referral to FRW.  Pt encouraged to attend MH appt even through insurance is not active.  Educated pt that MA back dates 90 days once approved.  Pt agreeable to keeping initial MH therapy appt.      CC SW provided following information to pt:    Housing Link search- subsidized   https://[email or web address]/[email or web address]    Common Bond  https://commonbond.org/find-[email or web address]-home/    Social Security Replacement:  https://www.ssa.gov/ssnumber/ss5doc.htm  Once you have your birth certificate you can complete SS application and mail to :  Address: PHONE: [phone] [address] [address]   Fax: [phone]       Outreach Frequency: monthly  Future Appointments              In 2 weeks Kirsten Dickson MD [facility], [facility]          Plan:   FRW to outreach to pt.  CC SW to follow up in 3 weeks.  No CHW outreach at this time.

## 2021-07-06 VITALS — WEIGHT: 155 LBS

## 2021-10-16 ENCOUNTER — HEALTH MAINTENANCE LETTER (OUTPATIENT)
Age: 31
End: 2021-10-16

## 2022-06-09 ENCOUNTER — HOSPITAL ENCOUNTER (EMERGENCY)
Facility: CLINIC | Age: 32
Discharge: ANOTHER HOSPITAL | End: 2022-06-09
Payer: OTHER MISCELLANEOUS

## 2022-06-09 VITALS
SYSTOLIC BLOOD PRESSURE: 129 MMHG | TEMPERATURE: 97.7 F | RESPIRATION RATE: 12 BRPM | WEIGHT: 142 LBS | OXYGEN SATURATION: 98 % | HEART RATE: 102 BPM | DIASTOLIC BLOOD PRESSURE: 80 MMHG

## 2022-06-09 NOTE — ED NOTES
Patient is not here, writer called two phone numbers and called for her and she is not found in the lobby.

## 2022-06-09 NOTE — ED TRIAGE NOTES
Patient cut her finger bad at work, FR assessed and they had a hard time getting the bleeding to stop so EMS was called and she was transported.      Triage Assessment     Row Name 06/09/22 1412       Triage Assessment (Adult)    Airway WDL WDL       Respiratory WDL    Respiratory WDL WDL       Skin Circulation/Temperature WDL    Skin Circulation/Temperature WDL WDL       Cardiac WDL    Cardiac WDL WDL       Peripheral/Neurovascular WDL    Peripheral Neurovascular WDL WDL       Cognitive/Neuro/Behavioral WDL    Cognitive/Neuro/Behavioral WDL WDL

## 2022-06-27 ENCOUNTER — OFFICE VISIT (OUTPATIENT)
Dept: URGENT CARE | Facility: URGENT CARE | Age: 32
End: 2022-06-27
Payer: OTHER MISCELLANEOUS

## 2022-06-27 VITALS
DIASTOLIC BLOOD PRESSURE: 81 MMHG | SYSTOLIC BLOOD PRESSURE: 120 MMHG | TEMPERATURE: 98 F | OXYGEN SATURATION: 100 % | HEART RATE: 68 BPM

## 2022-06-27 DIAGNOSIS — Z48.02 VISIT FOR SUTURE REMOVAL: Primary | ICD-10-CM

## 2022-06-27 PROCEDURE — 99212 OFFICE O/P EST SF 10 MIN: CPT | Performed by: FAMILY MEDICINE

## 2022-06-27 NOTE — PROGRESS NOTES
Maude Sanchez presents to the clinic today for  removal of sutures.  The patient has had the sutures in place for 18 days.    There has been no history of infection or drainage.    O: 2 sutures are seen located on the finger.  The wound is healing well with no signs of infection.    Tetanus status is up to date.    A: Suture removal.    P:  All sutures were easily removed today.  Routine wound care discussed.  The patient will follow up as needed.

## 2022-07-23 ENCOUNTER — HEALTH MAINTENANCE LETTER (OUTPATIENT)
Age: 32
End: 2022-07-23

## 2022-10-01 ENCOUNTER — HEALTH MAINTENANCE LETTER (OUTPATIENT)
Age: 32
End: 2022-10-01

## 2023-08-06 ENCOUNTER — HEALTH MAINTENANCE LETTER (OUTPATIENT)
Age: 33
End: 2023-08-06

## 2024-09-29 ENCOUNTER — HEALTH MAINTENANCE LETTER (OUTPATIENT)
Age: 34
End: 2024-09-29